# Patient Record
Sex: MALE | Race: WHITE | Employment: FULL TIME | URBAN - METROPOLITAN AREA
[De-identification: names, ages, dates, MRNs, and addresses within clinical notes are randomized per-mention and may not be internally consistent; named-entity substitution may affect disease eponyms.]

---

## 2020-06-08 ENCOUNTER — HOSPITAL ENCOUNTER (OUTPATIENT)
Dept: CT IMAGING | Age: 30
Discharge: HOME | End: 2020-06-10
Payer: COMMERCIAL

## 2020-06-08 PROCEDURE — 74176 CT ABD & PELVIS W/O CONTRAST: CPT

## 2020-11-11 ENCOUNTER — OFFICE VISIT (OUTPATIENT)
Dept: PRIMARY CARE CLINIC | Age: 30
End: 2020-11-11
Payer: COMMERCIAL

## 2020-11-11 ENCOUNTER — HOSPITAL ENCOUNTER (OUTPATIENT)
Age: 30
Setting detail: SPECIMEN
Discharge: HOME | End: 2020-11-11
Payer: COMMERCIAL

## 2020-11-11 VITALS
HEART RATE: 64 BPM | WEIGHT: 215 LBS | OXYGEN SATURATION: 98 % | BODY MASS INDEX: 31.84 KG/M2 | HEIGHT: 69 IN | TEMPERATURE: 97.2 F

## 2020-11-11 PROCEDURE — 99203 OFFICE O/P NEW LOW 30 MIN: CPT | Performed by: NURSE PRACTITIONER

## 2020-11-11 PROCEDURE — G8417 CALC BMI ABV UP PARAM F/U: HCPCS | Performed by: NURSE PRACTITIONER

## 2020-11-11 PROCEDURE — G8484 FLU IMMUNIZE NO ADMIN: HCPCS | Performed by: NURSE PRACTITIONER

## 2020-11-11 PROCEDURE — G8427 DOCREV CUR MEDS BY ELIG CLIN: HCPCS | Performed by: NURSE PRACTITIONER

## 2020-11-11 PROCEDURE — 1036F TOBACCO NON-USER: CPT | Performed by: NURSE PRACTITIONER

## 2020-11-11 ASSESSMENT — PATIENT HEALTH QUESTIONNAIRE - PHQ9
SUM OF ALL RESPONSES TO PHQ QUESTIONS 1-9: 0
SUM OF ALL RESPONSES TO PHQ9 QUESTIONS 1 & 2: 0
2. FEELING DOWN, DEPRESSED OR HOPELESS: 0
1. LITTLE INTEREST OR PLEASURE IN DOING THINGS: 0
SUM OF ALL RESPONSES TO PHQ QUESTIONS 1-9: 0
SUM OF ALL RESPONSES TO PHQ QUESTIONS 1-9: 0

## 2020-11-11 ASSESSMENT — ENCOUNTER SYMPTOMS
RHINORRHEA: 1
COUGH: 1
SORE THROAT: 1

## 2020-11-11 NOTE — PROGRESS NOTES
[address]  [address] [facility] [address] [address]  Dept: [phone]  Dept Fax: [phone]    Mitchel Thao a 31 y.o. male who presents to the urgent care today for his medical conditions/complaintsas noted below. Mitchel Thao is c/o of Concern For COVID-19 (cough, congestion, fatigue, headaches, sore throat,x5 days, positive exposure to 2 friends)      HPI:     Cc- concern for covid  Has covid 19 exposure  He has cough, congestion, fatigue, headache and sore throat for 5 days    URI    This is a new problem. Episode onset: 5 days. The problem has been waxing and waning. There has been no fever. Associated symptoms include congestion, coughing, headaches, rhinorrhea and a sore throat. Associated symptoms comments: fatigue. History reviewed. No pertinent past medical history. No current outpatient medications on file. No current facility-administered medications for this visit. Allergies   Allergen Reactions    Amoxicillin     Cefaclor        Subjective:     Review of Systems   Constitutional: Positive for fatigue. HENT: Positive for congestion, rhinorrhea and sore throat. Respiratory: Positive for cough. Musculoskeletal: Positive for myalgias. Neurological: Positive for headaches. All other systems reviewed and are negative. Objective:      Physical Exam  Vitals signs and nursing note reviewed. Constitutional:       General: He is not in acute distress. Appearance: Normal appearance. He is well-developed. He is not ill-appearing, toxic-appearing or diaphoretic. HENT:      Head: Normocephalic and atraumatic. Nose: Nose normal.      Mouth/Throat:      Mouth: Mucous membranes are moist.   Eyes:      General: No scleral icterus. Right eye: No discharge. Left eye: No discharge. Neck:      Musculoskeletal: Normal range of motion and neck supple. No neck rigidity.    Cardiovascular: Rate and Rhythm: Normal rate and regular rhythm. Heart sounds: Normal heart sounds. Pulmonary:      Effort: Pulmonary effort is normal. No respiratory distress. Breath sounds: Normal breath sounds. No stridor. No wheezing, rhonchi or rales. Abdominal:      General: Bowel sounds are normal.      Palpations: Abdomen is soft. Musculoskeletal: Normal range of motion. General: No signs of injury. Skin:     General: Skin is warm and dry. Coloration: Skin is not jaundiced or pale. Findings: No erythema or rash. Neurological:      General: No focal deficit present. Mental Status: He is alert and oriented to person, place, and time. Psychiatric:         Mood and Affect: Mood normal.         Behavior: Behavior normal.       Pulse 64   Temp 97.2 °F (36.2 °C) (Tympanic)   Ht 5' 9\" (1.753 m)   Wt 215 lb (97.5 kg)   SpO2 98%   BMI 31.75 kg/m²     Assessment:          Diagnosis Orders   1. Suspected COVID-19 virus infection  COVID-19 Ambulatory   2. Exposure to COVID-19 virus  COVID-19 Ambulatory       Plan: You were tested for COVID today. We will call you with results when they are available. If you have not heard from us in 7 days, please call our office. Patient was evaluated [place] today during this pandemic covid 19 situation.     Quarantine as directed  Await results  Increase fluids- stay hydrated  Good handwashing  Supportive care as discussed    If having symptoms- you need to be fever free for 24 hours, other symptoms resolved and at least 10 days passed since first symptom appeared before discontinuing  quarantine- CDC guidelines    tylenol as directed as needed over the counter if able to take  go to the ER for chest pain, short of breath, hard time breathing, persistent vomiting, feeling weaker or dehydrated, any worsening, change or concern  Follow up with primary care for re evaluation  [place] covid 19 call center - [phone]- [phone]  Another good resource for

## 2020-11-11 NOTE — PATIENT INSTRUCTIONS
You were tested for COVID today. We will call you with results when they are available. If you have not heard from us in 7 days, please call our office. Patient was evaluated [place] today during this pandemic covid 19 situation. Quarantine as directed  Await results  Increase fluids- stay hydrated  Good handwashing  Supportive care as discussed    If having symptoms- you need to be fever free for 24 hours, other symptoms resolved and at least 10 days passed since first symptom appeared before discontinuing  quarantine- CDC guidelines    tylenol as directed as needed over the counter if able to take  go to the ER for chest pain, short of breath, hard time breathing, persistent vomiting, feeling weaker or dehydrated, any worsening, change or concern  Follow up with primary care for re evaluation  [place] covid 19 call center - [phone]- [phone]  Another good resource for information is coronavirus. [email or web address].gov    If exposure, it is recommended 14 day quarantine

## 2020-11-13 LAB — SARS-COV-2, NAA: DETECTED

## 2020-11-14 ENCOUNTER — TELEPHONE (OUTPATIENT)
Dept: PRIMARY CARE CLINIC | Age: 30
End: 2020-11-14